# Patient Record
Sex: MALE | Race: ASIAN | NOT HISPANIC OR LATINO | URBAN - METROPOLITAN AREA
[De-identification: names, ages, dates, MRNs, and addresses within clinical notes are randomized per-mention and may not be internally consistent; named-entity substitution may affect disease eponyms.]

---

## 2018-08-20 ENCOUNTER — OFFICE VISIT - HEALTHEAST (OUTPATIENT)
Dept: FAMILY MEDICINE | Facility: CLINIC | Age: 38
End: 2018-08-20

## 2018-08-20 DIAGNOSIS — Z00.00 ROUTINE GENERAL MEDICAL EXAMINATION AT A HEALTH CARE FACILITY: ICD-10-CM

## 2018-08-20 DIAGNOSIS — M51.379 DEGENERATION OF LUMBAR OR LUMBOSACRAL INTERVERTEBRAL DISC: ICD-10-CM

## 2018-08-20 ASSESSMENT — MIFFLIN-ST. JEOR: SCORE: 1826.95

## 2021-05-31 ENCOUNTER — RECORDS - HEALTHEAST (OUTPATIENT)
Dept: ADMINISTRATIVE | Facility: CLINIC | Age: 41
End: 2021-05-31

## 2021-06-01 VITALS — BODY MASS INDEX: 31.52 KG/M2 | WEIGHT: 208 LBS | HEIGHT: 68 IN

## 2021-06-19 NOTE — PROGRESS NOTES
ASSESMENT AND PLAN:    Physical, Health Maitenence -   Reviewed healthy lifestyle, diet, exercise, vitamins, and follow-up plan today with patient.  Reviewed age appropriate cancer and other screening recommendations.  Immunization review and update done.  Reviewed indicated lab tests, see lab orders -I recommended CMP, LDL and consider hepatitis B surface antigen.  He thinks this may have been done in the past.  He does not want to do blood draw today.  I did recommend yearly health maintenance follow-up, consider blood testing at his next visit if he is in agreement.      Lumbar Disc Degeneration  Recent mild to moderate exacerbation.  No radicular findings on exam and the history of symptoms are very low-grade as detailed below.  We discussed options and are going to use naproxen as prescribed below.  Reviewed the risks and benefits of the medication.  He will discontinue all over-the-counter NSAIDs.  We reviewed indications for follow-up.  Counseled on modified activities.  -     naproxen (NAPROSYN) 500 MG tablet; Take 1 tablet (500 mg total) by mouth 2 (two) times a day with meals.  Dispense: 60 tablet; Refill: 3          HPI: 38-year-old male here for physical and for concerns about his low back pain.  Patient has not been in in a couple of years.  He is back had been doing well up until a couple of months ago.  He has a remote history of back surgery for lumbar disc problems.  This was back in 2008.  Over the last couple of months he has had some intermittent mild to moderate pain in his low back.  He will sometimes get an abnormal sensation that radiates down into the legs, left side worse than right.  It is not a radiating pain.  It is more of a paresthesia.  He has not had any loss of strength or bowel or bladder dysfunction.  The pain stays localized to his low back and is moderate in severity, occurs sometimes with bending and twisting motions.    ROS: No chest pain, no shortness of breath, no blood in  "the urine, no blood in the stool, remainder review of systems is as above or negative.    No past medical history on file.    Current Outpatient Prescriptions   Medication Sig Dispense Refill     naproxen (NAPROSYN) 500 MG tablet Take 1 tablet (500 mg total) by mouth 2 (two) times a day with meals. 60 tablet 3     No current facility-administered medications for this visit.        Patient Active Problem List   Diagnosis     Acute Sore Throat     Fever (Symptom)     Nephrolithiasis     Lumbar Disc Degeneration       Social History     Social History     Marital status:      Spouse name: N/A     Number of children: N/A     Years of education: N/A     Social History Main Topics     Smoking status: Never Smoker     Smokeless tobacco: Never Used     Alcohol use No     Drug use: No     Sexual activity: Not Asked     Other Topics Concern     None     Social History Narrative   Patient went through a difficult time with his wife over the last several years, they have been  but are not .  He reports that the relationship has been improving and despite some ongoing disagreements around finances and [employer] they are working together to make a good life for their children and their family.  Patient works as a [employer] .  Patient does not have any concerns about exposure to sexually transmitted diseases.    History   Smoking Status     Never Smoker   Smokeless Tobacco     Never Used       OBJECTICE: /86 (Patient Site: Right Arm, Patient Position: Sitting, Cuff Size: Adult Large)  Pulse 99  Temp 98.1  F (36.7  C) (Oral)   Resp 16  Ht 5' 8.25\" (1.734 m)  Wt 208 lb (94.3 kg)  SpO2 97%  BMI 31.4 kg/m2      Gen - alert, orientated, NAD  Eyes - fundascopic exam limited by the undialated pupil but looks symmetric  ENT - oropharynx clear, TMs clear  Neck - supple, no palpable mass or lymphadenopathy  CV - RRR, no murmur  Resp - lungs CTA  Ab - soft, nontender, no palpable mass " or organomegaly   - refused  Extrem - warm, no edema  Neuro - CN II-XII intact, strength, sensation, reflexes intact and symmetric  Skin - no rash, no atypical appearing lesions seen.   Musculoskeletal-no point tenderness to palpation of the lumbar spine.        Brian Mosquera   5:21 PM 8/20/2018